# Patient Record
(demographics unavailable — no encounter records)

---

## 2024-12-02 NOTE — HISTORY OF PRESENT ILLNESS
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  Approximately 2 weeks ago patient had fallen down in the bathroom and had been transported by the EMS service of the fire department to the [facility] where he had undergone CT scans and x-rays. Patient's wife with concerns of his recent fall and to review the radiographic results from x-rays and CT scan to make sure he is orthopedically intact with his lower back pain. Patient also has a remote history of low back pain and a lack of being able to ambulate secondary to bilateral buttock and legs pain and history of neuropathy. Patient is status post laminectomies at L5-S1 and a revision in 1979 of the same level.

## 2024-12-02 NOTE — HISTORY OF PRESENT ILLNESS
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  Approximately 2 weeks ago patient had fallen down in the bathroom and had been transported by the EMS service of the fire department to the [facility] where he had undergone CT scans and x-rays. Patient's wife with concerns of his recent fall and to review the radiographic results from x-rays and CT scan to make sure he is orthopedically intact with his lower back pain. Patient also has a remote history of low back pain and a lack of being able to ambulate secondary to bilateral buttock and legs pain and history of neuropathy. Patient is status post laminectomies at L5-S1 and a revision in 1979 of the same level.

## 2024-12-02 NOTE — PHYSICAL EXAM
[Stooped] : stooped [Cane] : ambulates with cane [Knee] : patellar 2+ and symmetric bilaterally [Ankle] : ankle 2+ and symmetric bilaterally [Normal] : No costovertebral angle tenderness and no spinal tenderness [Wide-Based] : wide-based [LE] : Sensory: Intact in bilateral lower extremities [de-identified] : Negative FABERs of bilateral hips. Negative palpable tenderness of the greater trochanteric lateral hips. No noted atrophy noted of bilateral LE musculature. [de-identified] : Patient is unable to do heel and toe walk secondary to lack of balance. Negative straight leg tension signs. [de-identified] : X-rays 4 views of the lumbar spine with noted multilevel arthrosis and lateral views of no gross instability with flexion and extension views.  Noted multilevel foraminal stenosis.  No obvious fractures, no bony tumors, no infection.

## 2024-12-02 NOTE — DISCUSSION/SUMMARY
[Medication Risks Reviewed] : Medication risks reviewed [Surgical risks reviewed] : Surgical risks reviewed [PRN] : PRN [de-identified] : Dr. Robles had explained to the patient and his wife with the pre-existing arthritis prior to his recent bathroom incident of falling down may have flared up the multilevel arthritis of the lower back.  Encouragement of having to ambulate is important to progress with his current existing inflammation.  I discussed the underlying pathophysiology of the patient's condition. I expressed to the patient that based on his previous studies of todays xrays and multiple  CT scans I advised that the patient should attend physical therapy if he has found some alleviation to his symptoms. Activity modifications were reviewed with the patient at length. If the patient begins to experience any changes or severe exacerbation of his symptoms, he should reach out to me as soon as possible. [Otherwise, he should return as needed.]   I, Dr. Luciano Robles, personally performed the evaluation and management (E/M) services for this new patient.  That E/M includes conducting the initial examination, assessing all conditions, and establishing a plan of care.  Today, my ACP, Tami Moore, was here to observe my evaluation and management services for this patient to be followed going forward.

## 2024-12-02 NOTE — DISCUSSION/SUMMARY
[Medication Risks Reviewed] : Medication risks reviewed [Surgical risks reviewed] : Surgical risks reviewed [PRN] : PRN [de-identified] : Dr. Robles had explained to the patient and his wife with the pre-existing arthritis prior to his recent bathroom incident of falling down may have flared up the multilevel arthritis of the lower back.  Encouragement of having to ambulate is important to progress with his current existing inflammation.  I discussed the underlying pathophysiology of the patient's condition. I expressed to the patient that based on his previous studies of todays xrays and multiple  CT scans I advised that the patient should attend physical therapy if he has found some alleviation to his symptoms. Activity modifications were reviewed with the patient at length. If the patient begins to experience any changes or severe exacerbation of his symptoms, he should reach out to me as soon as possible. [Otherwise, he should return as needed.]   I, Dr. Luciano Robles, personally performed the evaluation and management (E/M) services for this new patient.  That E/M includes conducting the initial examination, assessing all conditions, and establishing a plan of care.  Today, my ACP, Tami Moore, was here to observe my evaluation and management services for this patient to be followed going forward.

## 2024-12-02 NOTE — PHYSICAL EXAM
[Stooped] : stooped [Cane] : ambulates with cane [Knee] : patellar 2+ and symmetric bilaterally [Ankle] : ankle 2+ and symmetric bilaterally [Normal] : No costovertebral angle tenderness and no spinal tenderness [Wide-Based] : wide-based [LE] : Sensory: Intact in bilateral lower extremities [de-identified] : Negative FABERs of bilateral hips. Negative palpable tenderness of the greater trochanteric lateral hips. No noted atrophy noted of bilateral LE musculature. [de-identified] : Patient is unable to do heel and toe walk secondary to lack of balance. Negative straight leg tension signs. [de-identified] : X-rays 4 views of the lumbar spine with noted multilevel arthrosis and lateral views of no gross instability with flexion and extension views.  Noted multilevel foraminal stenosis.  No obvious fractures, no bony tumors, no infection.